# Patient Record
Sex: FEMALE | Race: WHITE | URBAN - METROPOLITAN AREA
[De-identification: names, ages, dates, MRNs, and addresses within clinical notes are randomized per-mention and may not be internally consistent; named-entity substitution may affect disease eponyms.]

---

## 2019-09-04 ENCOUNTER — TELEPHONE (OUTPATIENT)
Dept: PLASTIC SURGERY | Facility: CLINIC | Age: 24
End: 2019-09-04

## 2019-09-04 NOTE — TELEPHONE ENCOUNTER
Contacted pt regarding scheduling FT Top Surgery. Pts voicemail is not set up. Will attempt to contact pt at a later time. Sarah POOLE RNCC

## 2019-09-04 NOTE — TELEPHONE ENCOUNTER
[facility] Call Center    Phone Message    May a detailed message be left on voicemail: yes    Reason for Call: Other: [facility] is being [facility] for [facility] Top [facility] consult. Referral will be faxed to the clinic. Please call the patient directly for scheduling.     Action Taken: Message routed to:  [facility] ([facility]

## 2019-09-09 ENCOUNTER — PATIENT OUTREACH (OUTPATIENT)
Dept: PLASTIC SURGERY | Facility: CLINIC | Age: 24
End: 2019-09-09

## 2019-09-09 NOTE — PATIENT INSTRUCTIONS
Left message for pt regarding scheduling consult. Provided direct contact information and requested call back to discuss. Sarah POOLE RNCC